# Patient Record
Sex: FEMALE | Race: WHITE
[De-identification: names, ages, dates, MRNs, and addresses within clinical notes are randomized per-mention and may not be internally consistent; named-entity substitution may affect disease eponyms.]

---

## 2018-12-27 NOTE — OP
Operative Note





- Note:


Operative Date: 18


Pre-Operative Diagnosis: prior  delivery, abdominal pain, contractions


Operation: repeat low transverse  delivery


Findings: 





normal b/l tubes/ovaries


live male 


Post-Operative Diagnosis: Same as Pre-op


Surgeon: Lisa Saenz


Assistant: Anton Lanier


Anesthesiologist/CRNA: Elias Gomes


Anesthesia: Spinal


Specimens Removed: placenta


Estimated Blood Loss (mls): 600


Operative Report Dictated: Yes

## 2018-12-27 NOTE — OP
DATE OF OPERATION:  2018

 

PREOPERATIVE DIAGNOSIS:  Prior  delivery.  Abdominal pain.  Uterine

contractions.  Declined  trial of labor after .    

 

POSTOPERATIVE DIAGNOSIS:  Prior  delivery.  Abdominal pain.  Uterine

contractions.  Declined trial of labor after .  

 

SURGERY:  Repeat low transverse  section.  

 

SURGEON:  Lisa Saenz D.O. 

 

ASSISTANT:  MERRILL Costa  

 

ANESTHESIA:  Spinal.

 

ANESTHESIOLOGIST:  Elias Gomes M.D. 

 

ESTIMATED BLOOD LOSS:  600 mL . 

 

SPECIMENS REMOVED:  Placenta.  

 

ESTIMATED BLOOD LOSS:  25 mL.  

 

COMPLICATIONS:  Included elevated blood pressure during procedure requiring 3 
doses

of antihypertensive medications IV.  

COUNTS:  Sponge, needle, instrument counts correct.

 

DISPOSITION:  Stable to PACU.

 

BRIEF HISTORY AND PROCEDURE:  Patient is a 33-year-old P3 female who had been

complaining of abdominal pain and was brought to the hospital and found to have 
contractions on the uterine

tocometer.  She had a prior  delivery and  patient declined a trial of 
labor

after .  At this time the patient was admitted to the hospital and 
signed

consents and was planned for repeat  delivery.  The patient was taken 
back to

the operating room after consents were signed.  She was given spinal anesthesia 
by

Dr. Gomes, placed in the dorsal supine position.  A Mars catheter was placed 
under

sterile conditions.  She was prepped and draped in the usual sterile fashion 
and a

hard timeout was performed.  A Pfannenstiel skin incision was created in the 
skin

with a scalpel and carried to the underlying layer of rectus fascia with the 
Bovie. 

The fascia was incised on either side of the midline with a Bovie, and the 
fascial

incision was carried in the superior lateral direction sharply.  The fascia was

tented upward and dissected off the underlying layer of rectus muscle sharpy.  
The muscles were

identified in the midline and  laterally.  The peritoneum was 
identified and

entered sharply, and the bladder blade was then inserted.  A transverse 
incision in

the lower uterine segment was made and extended in the superior lateral 
direction

bluntly.  The infant was delivered from the left occipital transverse position

without difficulty.  Bilateral shoulders delivered with ease along with the 
remainder

of the infant.  The cord was clamped twice and cut in between.  The infant was 
taken

over to the warmer to be assessed by the neonatology staff, who were present 
for the

entire delivery.  The placenta was delivered intact with a 3-vessel cord.  The 
uterus

was exteriorized and cleared of all amniotic membrane and debris with a dry lap

sponge.  The hysterotomy was reapproximated in a double layer closure, using 1 
Vicryl

suture in a running, locked fashion.  Second layer was 0 Biosyn suture in a 
running,

locked fashion.  Excellent hemostasis was achieved.  The posterior cul-de-sac 
was

suctioned.  Bilateral tubes and ovaries were noted to be normal.  The uterus was

placed back into the abdomen.  Bilateral gutters were inspected and cleared of 
all

debris.  At this point, the patient did start to begin complaining of abdominal 
pain

and nausea and vomiting.  She then started to complain of severe headache.  
Blood

pressure did elevate.  Anesthesia administered 3 doses of antihypertensives

 to bring the pressure down.  Please see anesthesia record for those doses of 
medication.  The patient was

stabilized and the surgery was resumed.  The peritoneum was then reapproximated 
using

2-0 chromic in a running fashion.  The musculature was reapproximated using 2-0

chromic suture interrupted sutures.  The fascia was reapproximated using 1 
Vicryl

suture in a running fashion.  The subcutaneous tissue was irrigated and 
hemostasis

was achieved with Bovie cautery, and the skin was reapproximated using staples.
  The

patient tolerated the procedure well, and was recovering in the PACU at

the time of this dictation.  Sponge, needle and instrument counts were reported 
correct after the case.



Patient's blood pressure after delivery was stabilized. 

Patient's headache has resolved, and her abdominal pain was improving with pain

medication.  Patient will be sent to ICU for monitoring overnight secondary to 
sudden

blood pressure increase with symptoms of headache.  Will plan to start 
magnesium and get CT imaging of head if

any elevated blood pressures overnight.  Also will get neurology consultation. 

 

 

LISA SAENZ DO

 

/[id number]

DD: 2018 22:13

DT: 2018 23:04

Job #:  [id number]

MTDD

## 2018-12-27 NOTE — HP
Past Medical History





- Admission


History Source: Patient


Limitations to Obtaining History: No Limitations





- Past Medical History


Cardiovascular: No: HTN


Pulmonary: No: Asthma


Gastrointestinal: No: GERD


Hepatobiliary: No: Hepatitis B, Hepatitis C


Reproductive: No: PID


...: 5


...Para: 3


...Term: 3


...: 0


...Spon : 0


...Induced : 1


...EDC by Johana: 19


Infectious Disease: No: HIV, MRSA, STD's


Psych: No: Anxiety, Bipolar, Depression


Endocrine: Yes: Other (GDM-diet)


Additional Medical History: Maternal obesity





- Past Surgical History


Past Surgical History: Yes: 


Hx Myomectomy: No


Hx Transabdominal Cerclage: No





- Smoking History


Smoking history: Current every day smoker


Have you smoked in the past 12 months: Yes


Aproximately how many cigarettes per day: 15


If you are a former smoker, when did you quit?: 





- Alcohol/Substance Use


Hx Alcohol Use: Yes (SOCIAL, not in pregnancy)


History of Substance Use: reports: None





- Social History


ADL: Independent


History of Recent Travel: No





Home Medications





- Allergies


Allergies/Adverse Reactions: 


 Allergies











Allergy/AdvReac Type Severity Reaction Status Date / Time


 


Penicillins Allergy   Verified 18 18:25














- Home Medications


Home Medications: 


Ambulatory Orders





Prenatal Vitamins (Sjr) - 1 tab PO DAILY 16 


Ferrous Sulfate [Feosol] 325 mg PO BID 18 











Review of Systems





- Review of Systems


Constitutional: reports: No Symptoms


Eyes: reports: No Symptoms


HENT: reports: No Symptoms


Neck: reports: No Symptoms


Cardiovascular: reports: No Symptoms


Respiratory: reports: No Symptoms


Gastrointestinal: reports: Abdominal Pain (abdominal pain and contractions)


Genitourinary: reports: No Symptoms


Breasts: reports: No Symptoms Reported


Musculoskeletal: reports: No Symptoms


Integumentary: reports: No Symptoms


Neurological: reports: No Symptoms


Endocrine: reports: No Symptoms


Hematology/Lymphatic: reports: No Symptoms


Psychiatric: reports: No Symptoms


Pain Intensity: 5





Physical Exam - Maternity


Vital Signs: 


 Vital Signs











Temperature  98.0 F   18 18:07


 


Pulse Rate  84   18 18:07


 


Respiratory Rate  18   18 18:07


 


Blood Pressure  114/66   18 18:07


 


O2 Sat by Pulse Oximetry (%)      











Constitutional: Yes: Well Nourished, No Distress, Calm


Eyes: Yes: Conjunctiva Clear


HENT: Yes: Atraumatic


Neck: Yes: Supple


Cardiovascular: Yes: Regular Rate and Rhythm


Lungs: Clear to auscultation





- Abdominal Exam/OB


Number of Fetuses: Single


Fetal Presentation: Vertex


Contractions: Yes


Regularity: Irregular


Intensity: Mild/Mod


Fetal Monitor Mode: External


Fetal Heart Rate (range): 120


Category: I


Accelerations: Uniform


Decelerations: None





- Vaginal Exam/OB


Amniotic Membrane Status: Intact





- Physical Exam


Edema: No


Psychiatric: Yes: Alert, Oriented





Hemorrhage Risk Assessment





- Risk Factors


Medium Risk Factors: Yes: Prior , uterine surgery,or multiple 

laparotomies


High Risk Factors: Yes: None


Risk Score: 1


Risk Level: Medium Risk





Problem List





- Problems


(1) Uterine scar from previous  delivery


Code(s): O34.219 - MATERNAL CARE FOR UNSP TYPE SCAR FROM PREVIOUS  DEL 

  





(2) Abdominal pain complicating pregnancy


Code(s): O26.899 - OTH PREGNANCY RELATED CONDITIONS, UNSPECIFIED TRIMESTER; 

R10.9 - UNSPECIFIED ABDOMINAL PAIN   





Assessment/Plan





34 y/o  with SIUP at 38.6 weeks, planned  section on 18 who 

presents to labor and delivery with abdominal pain and contractions.  


Plan for repeat c section. 


R/B/A discussed, consents signed, questions answered


anesthesia and nursery aware

## 2018-12-28 NOTE — PN
Teaching Attending Note


Name of Resident: Flash Lakhani





ATTENDING PHYSICIAN STATEMENT





I saw and evaluated the patient.


I reviewed the resident's note and discussed the case with the resident.


I agree with the resident's findings and plan as documented.








SUBJECTIVE:


Patient seen and examined at bedside.  No headache.  


Minimal abdominal discomfort.


Dopamine @ 4 mcq for hemodynamic support. 





 Intake & Output











 12/25/18 12/26/18 12/27/18 12/28/18





 23:59 23:59 23:59 23:59


 


Intake Total    2598


 


Output Total    2000


 


Balance    598


 


Weight   176 lb 178 lb 8 oz








 Last Vital Signs











Temp Pulse Resp BP Pulse Ox


 


 98.1 F   74   19   99/64   100 


 


 12/28/18 10:00  12/28/18 10:00  12/28/18 10:00  12/28/18 10:00  12/28/18 00:13








Active Medications





Acetaminophen (Ofirmev Injection -)  1,000 mg IVPB Q6H PRN


   PRN Reason: PAIN LEVEL 6-10


   Last Admin: 12/28/18 03:07 Dose:  1,000 mg


Bisacodyl (Dulcolax Suppository -)  10 mg RC DAILY PRN


   PRN Reason: CONSTIPATION


Diphenhydramine HCl (Benadryl Injection -)  25 mg IVPUSH Q4H PRN


   PRN Reason: Pruritis


Enoxaparin Sodium (Lovenox -)  40 mg SQ DAILY TRAVIS


   Last Admin: 12/28/18 09:43 Dose:  40 mg


Dopamine HCl/Dextrose (Dopamine 400 Mg/D5w -)  400,000 mcg in 250 mls @ 14.969 

mls/hr IVPB TITR TRAVIS; Protocol


   Last Titration: 12/28/18 07:00 Dose:  4 mcg/kg/min, 11.975 mls/hr


Lactated Ringer's (Lactated Ringers Solution)  1,000 ml in 1,000 mls @ 100 mls/

hr IV ASDIR TRAVIS


   Last Admin: 12/28/18 08:00 Dose:  100 mls/hr


Lactated Ringer's (Lactated Ringers Solution)  1,000 ml IV ONCE ONE


   Stop: 12/28/18 11:21


Lactated Ringer's (Lactated Ringers Solution)  1,000 ml IV ONCE ONE


   Stop: 12/28/18 12:21


Ondansetron HCl (Zofran Injection)  4 mg IVPUSH Q4H PRN


   PRN Reason: NAUSEA


   Last Admin: 12/28/18 08:29 Dose:  4 mg


Oxycodone HCl (Roxicodone -)  5 mg PO Q4H PRN


   PRN Reason: PAIN LEVEL 4 - 6


   Last Admin: 12/28/18 05:55 Dose:  5 mg


Oxycodone HCl (Roxicodone -)  10 mg PO Q4H PRN


   PRN Reason: PAIN LEVEL 7 - 10


Prenatal Multivit/Folic Acid/Iron (Prenatal Vitamins (Sjr) -)  1 tab PO DAILY 

TRAVIS


Senna/Docusate Sodium (Pericolace -)  2 tablet PO HS PRN


   PRN Reason: CONSTIPATION


Simethicone (Mylicon -)  80 mg PO Q4H PRN


   PRN Reason: GAS








  








OBJECTIVE:





  


 Intake & Output











 12/25/18 12/26/18 12/27/18 12/28/18





 23:59 23:59 23:59 23:59


 


Intake Total    2598


 


Output Total    2000


 


Balance    598


 


Weight   176 lb 178 lb 8 oz





 Last Vital Signs











Temp Pulse Resp BP Pulse Ox


 


 98.1 F   74   19   99/64   100 


 


 12/28/18 10:00  12/28/18 10:00  12/28/18 10:00  12/28/18 10:00  12/28/18 00:13








Active Medications





Acetaminophen (Ofirmev Injection -)  1,000 mg IVPB Q6H PRN


   PRN Reason: PAIN LEVEL 6-10


   Last Admin: 12/28/18 03:07 Dose:  1,000 mg


Bisacodyl (Dulcolax Suppository -)  10 mg RC DAILY PRN


   PRN Reason: CONSTIPATION


Diphenhydramine HCl (Benadryl Injection -)  25 mg IVPUSH Q4H PRN


   PRN Reason: Pruritis


Enoxaparin Sodium (Lovenox -)  40 mg SQ DAILY TRAVIS


   Last Admin: 12/28/18 09:43 Dose:  40 mg


Dopamine HCl/Dextrose (Dopamine 400 Mg/D5w -)  400,000 mcg in 250 mls @ 14.969 

mls/hr IVPB TITR TRAVIS; Protocol


   Last Titration: 12/28/18 07:00 Dose:  4 mcg/kg/min, 11.975 mls/hr


Lactated Ringer's (Lactated Ringers Solution)  1,000 ml in 1,000 mls @ 100 mls/

hr IV ASDIR TRAVIS


   Last Admin: 12/28/18 08:00 Dose:  100 mls/hr


Lactated Ringer's (Lactated Ringers Solution)  1,000 ml IV ONCE ONE


   Stop: 12/28/18 11:21


Lactated Ringer's (Lactated Ringers Solution)  1,000 ml IV ONCE ONE


   Stop: 12/28/18 12:21


Ondansetron HCl (Zofran Injection)  4 mg IVPUSH Q4H PRN


   PRN Reason: NAUSEA


   Last Admin: 12/28/18 08:29 Dose:  4 mg


Oxycodone HCl (Roxicodone -)  5 mg PO Q4H PRN


   PRN Reason: PAIN LEVEL 4 - 6


   Last Admin: 12/28/18 05:55 Dose:  5 mg


Oxycodone HCl (Roxicodone -)  10 mg PO Q4H PRN


   PRN Reason: PAIN LEVEL 7 - 10


Prenatal Multivit/Folic Acid/Iron (Prenatal Vitamins (Sjr) -)  1 tab PO DAILY 

TRAVIS


Senna/Docusate Sodium (Pericolace -)  2 tablet PO HS PRN


   PRN Reason: CONSTIPATION


Simethicone (Mylicon -)  80 mg PO Q4H PRN


   PRN Reason: GAS














GENERAL: A&Ox3, NAD


HEAD: NC/AT


EYES: PERRLA, EOMI


ENT: MMM


NECK: Trachea midline, full range of motion, supple. 


LUNGS: CTA b/l


HEART: RRR no m/r/g


ABDOMEN: (+) BS, soft, tenderness c/w post-surgical status, dressing c/d/i


EXTREMITIES: 2+ pulses, warm, well-perfused, no edema. 


NEUROLOGICAL: CNs, motor, sensory systems w/o focal deficit


PSYCH: Normal mood, normal affect.


SKIN: Warm, dry, normal turgor, no rashes or lesions noted














 Laboratory Results - last 24 hr











  12/27/18 12/28/18 12/28/18





  22:45 01:00 01:00


 


WBC   12.7 H 


 


RBC   3.65 


 


Hgb   11.2 


 


Hct   32.6 


 


MCV   89.4 


 


MCH   30.8 


 


MCHC   34.5 


 


RDW   14.0 


 


Plt Count   123 L 


 


MPV   10.7 


 


Absolute Neuts (auto)   


 


Neutrophils %   


 


Lymphocytes %   


 


Monocytes %   


 


Eosinophils %   


 


Basophils %   


 


Nucleated RBC %   


 


Sodium    141


 


Potassium    3.6


 


Chloride    114 H


 


Carbon Dioxide    20 L


 


Anion Gap    7 L


 


BUN    7


 


Creatinine    0.4 L


 


Creat Clearance w eGFR    > 60


 


Random Glucose    78


 


Calcium    6.4 L*


 


Magnesium    1.5 L


 


Total Bilirubin   


 


AST   


 


ALT   


 


Alkaline Phosphatase   


 


Total Protein   


 


Albumin   


 


Urine Color  Yellow  


 


Urine Appearance  Clear  


 


Urine pH  6.0  


 


Ur Specific Gravity  1.033  


 


Urine Protein  1+ H  


 


Urine Glucose (UA)  Negative  


 


Urine Ketones  2+ H  


 


Urine Blood  1+ H  


 


Urine Nitrite  Negative  


 


Urine Bilirubin  Negative  


 


Urine Urobilinogen  Negative  


 


Ur Leukocyte Esterase  Negative  


 


Urine WBC (Auto)  None  


 


Urine RBC (Auto)  27  


 


Ur Epithelial Cells  Rare  


 


Urine Mucus  Few  














  12/28/18 12/28/18 12/28/18





  01:00 05:15 05:15


 


WBC   11.0 H 


 


RBC   4.04 


 


Hgb   11.8 


 


Hct   37.0 


 


MCV   91.6 


 


MCH   29.2 


 


MCHC   31.9 L 


 


RDW   14.4 


 


Plt Count   118 L 


 


MPV   10.4 


 


Absolute Neuts (auto)   8.7 H 


 


Neutrophils %   78.6 


 


Lymphocytes %   15.3 


 


Monocytes %   4.7 


 


Eosinophils %   0.9 


 


Basophils %   0.5 


 


Nucleated RBC %   0 


 


Sodium  142   140


 


Potassium  3.6   4.2


 


Chloride  114 H   110 H


 


Carbon Dioxide  20 L   22


 


Anion Gap  9   7 L


 


BUN  7   6 L


 


Creatinine  0.4 L   0.5 L


 


Creat Clearance w eGFR  > 60   > 60


 


Random Glucose  80   113 H


 


Calcium  6.4 L*   6.8 L*


 


Magnesium    1.9


 


Total Bilirubin  0.1 L  


 


AST  45 H  


 


ALT  56  


 


Alkaline Phosphatase  74  


 


Total Protein  4.4 L  


 


Albumin  1.8 L  


 


Urine Color   


 


Urine Appearance   


 


Urine pH   


 


Ur Specific Gravity   


 


Urine Protein   


 


Urine Glucose (UA)   


 


Urine Ketones   


 


Urine Blood   


 


Urine Nitrite   


 


Urine Bilirubin   


 


Urine Urobilinogen   


 


Ur Leukocyte Esterase   


 


Urine WBC (Auto)   


 


Urine RBC (Auto)   


 


Ur Epithelial Cells   


 


Urine Mucus   











ASSESSMENT/PLAN:


Resolving Hypotension: (?) Due to medications 


Transient Gina-operative hypertensive urgency 


S/P C section 12/27





Wean Dopamine 


Increase IVF resuscitation 


PO as tolerated


VTE prophylaxis 


Monitor I & O 


Incentive Spirometry 


ICU monitoring while on pressors





Dr Yost 


Critical care time spent in reviewing chart, evaluating patient and formulating 

plan - 36 minutes.

## 2018-12-28 NOTE — PN
Post Partum Progress Note





- Subjective


Subjective: 





pt seen and evaluated.   feeling well.  thirsty.  denies HA/CP/RUQ pain or 

changes in vision.  /50 upon my entry to room on dopamine.  No flatus 

yet.  Abdominal pain controlled with medications.  Not yet OOB.  Casillas catheter 

draining clear yellow urine. 


Type of Delivery: Repeat C/S


Vital Signs: 


 Vital Signs











Temperature  98.9 F   18 06:00


 


Pulse Rate  59 L  18 06:00


 


Respiratory Rate  23 H  18 06:00


 


Blood Pressure  119/61   18 06:00


 


O2 Sat by Pulse Oximetry (%)  100   18 00:13











Uterus: Yes: Fundus Firm


Incision: Yes: Dressing dry and intact


Abdomen/GI: Yes: Abdomen soft, Tender (appropriate post surgical tenderness)


Lochia: Yes: Rubra


Lochia, amount: Moderate


Perineum: No: Intact





- Labs


Labs: 


 CBC











WBC  11.0 K/mm3 (4.0-10.0)  H  18  05:15    


 


RBC  4.04 M/mm3 (3.60-5.2)   18  05:15    


 


Hgb  11.8 GM/dL (10.7-15.3)   18  05:15    


 


Hct  37.0 % (32.4-45.2)   18  05:15    


 


MCV  91.6 fl (80-96)   18  05:15    


 


MCH  29.2 pg (25.7-33.7)   18  05:15    


 


MCHC  31.9 g/dl (32.0-36.0)  L  18  05:15    


 


RDW  14.4 % (11.6-15.6)   18  05:15    


 


Plt Count  118 K/MM3 (134-434)  L  18  05:15    


 


MPV  10.4 fl (7.5-11.1)   18  05:15    


 


Absolute Neuts (auto)  8.7 K/mm3 (1.5-8.0)  H  18  05:15    


 


Neutrophils %  78.6 % (42.8-82.8)   18  05:15    


 


Lymphocytes %  15.3 % (8-40)   18  05:15    


 


Monocytes %  4.7 % (3.8-10.2)   18  05:15    


 


Eosinophils %  0.9 % (0-4.5)   18  05:15    


 


Basophils %  0.5 % (0-2.0)   18  05:15    


 


Nucleated RBC %  0 % (0-0)   18  05:15    














Problem List





- Problems


(1) Uterine scar from previous  delivery


Code(s): O34.219 - MATERNAL CARE FOR UNSP TYPE SCAR FROM PREVIOUS  DEL 

  





(2) Abdominal pain complicating pregnancy


Code(s): O26.899 - OTH PREGNANCY RELATED CONDITIONS, UNSPECIFIED TRIMESTER; 

R10.9 - UNSPECIFIED ABDOMINAL PAIN   





(3) Hypotension after procedure


Code(s): I95.81 - POSTPROCEDURAL HYPOTENSION   





Assessment/Plan





clear diet


blood pressure control per medical/icu team


if able to stabilize BP can dc from ICU to floor


lovenox


scds


discontinue casillas catheter this afternoon/evening


strict I/Os for now

## 2018-12-28 NOTE — PN
Progress Note (short form)





- Note


Progress Note: 





Anesthesia/pain


Pt seen and examined


S:Alert and awake comfortable


O:


 CBC, BMP





 18 05:15 





 18 05:15 





 Vital Signs











Temperature  98.9 F   18 06:00


 


Pulse Rate  59 L  18 06:00


 


Respiratory Rate  23 H  18 06:00


 


Blood Pressure  119/61   18 06:00


 


O2 Sat by Pulse Oximetry (%)  100   18 00:13











A/P:


Current Active Problems





Abdominal pain complicating pregnancy (Acute)


Hypotension after procedure (Acute)


Uterine scar from previous  delivery (Acute)








s/p c section


BP stablized


Doing well post op


Continue current care


Malick Sanchez MD

## 2018-12-28 NOTE — CONSULT
Consult


Consult Specialty:: Pulm/CCM


Referred by:: Dr. Saenz


Reason for Consultation:: medication induced hypotension





- History of Present Illness


History of Present Illness: 





32 yo  w/ no significant medical history now s/p  admitted to the 

ICU for medication induced hypotension. Patient was transiently hypertensive 

with SBP ~ 240 and c/o headache intraoperatively. She subsequently received 

10mg IV metoprolol, 10mg IV verapramil, 10mg IV enalarpril. Unfortunately her 

BP tanked to the low 100s. Per signout from L&D nurse, patient received 2L of NS

+oxytocin and 1L of NS. Patient received another 1L of NS in ICU but BP still 

remains low with MAP in low 50s. As a result, she's admitted to the ICU for 

further fluid resuscitation, BP monitoring, stroke precaution and possible 

pressor support. Denies h/o preclampsia, chest pain, vision change, focal 

weakness, shortness of breath, difficulty in speech.





- Past Medical History


Cardio/Vascular: No: HTN


Pulmonary: No: Asthma


Gastrointestinal: No: GERD


Hepatobiliary: No: Hepatitis B, Hepatitis C


...LMP: 13


Infectious Disease: No: HIV, MRSA, STD's


Psych: No: Anxiety, Bipolar, Depression


Endocrine: Yes: Other (GDM-diet)


Additional Medical History: Maternal obesity





- Past Surgical History


Past Surgical History: Yes: 





- Alcohol/Substance Use


Hx Alcohol Use: Yes (SOCIAL, not in pregnancy)


History of Substance Use: reports: None





- Smoking History


Smoking history: Current every day smoker


Have you smoked in the past 12 months: Yes


Aproximately how many cigarettes per day: 15


If you are a former smoker, when did you quit?: 





- Social History


ADL: Independent


History of Recent Travel: No





Home Medications





- Allergies


Allergies/Adverse Reactions: 


 Allergies











Allergy/AdvReac Type Severity Reaction Status Date / Time


 


Penicillins Allergy   Verified 18 18:25














- Home Medications


Home Medications: 


Ambulatory Orders





Prenatal Vitamins (Sjr) - 1 tab PO DAILY 16 


Ferrous Sulfate [Feosol] 325 mg PO BID 18 











Review of Systems





- Review of Systems


Constitutional: denies: Chills, Fever


Eyes: denies: Blind Spots, Blurred Vision, Double Vision, Recent Change in 

Vision


Cardiovascular: denies: Chest Pain, Palpitations, Shortness of Breath


Respiratory: denies: Cough


Gastrointestinal: denies: Abdominal Pain


Neurological: denies: Change in LOC, Change in Speech, Confusion, Headache, 

Numbness, Parasthesia, Weakness


Pain Intensity: 0





Physical Exam


Vital Signs: 


 Vital Signs











Temperature  97.7 F   18 21:45


 


Pulse Rate  63   18 22:30


 


Respiratory Rate  18   18 22:30


 


Blood Pressure  75/37 L  18 22:30


 


O2 Sat by Pulse Oximetry (%)  100   18 22:30











Constitutional: Yes: Well Nourished, No Distress, Calm


Eyes: Yes: Conjunctiva Clear, EOM Intact, PERRL


HENT: Yes: Atraumatic, Normocephalic


Neck: Yes: Supple, Trachea Midline


Cardiovascular: Yes: Bradycardia, S1, S2.  No: Murmur


Respiratory: Yes: Regular, CTA Bilaterally


Gastrointestinal: Yes: Other (dressing in place)


Edema: No


Wound/Incision: Yes: Clean/Dry.  No: Bleeding


Neurological: Yes: Alert, Oriented, Cran Nerves II-XII Intact.  No: Aphasia





Assessment/Plan





32 yo  w/ no significant medical history now s/p  admitted to the 

ICU for medication induced hypotension.





A/P





1. medication induced hypotension: refractory, s/p 4L of isotonic fluids, will 

start dopamine gtt in light of bradycardia and peripherally in anticipation of 

short duration of pressor support (< 4hours)





2. s/p  w/ 600cc blood loss: repeat CBC and BMP














Bharath Alston MD


ICU resident








Visit type





- Emergency Visit


Emergency Visit: No





- New Patient


This patient is new to me today: Yes


Date on this admission: 18





- Critical Care


Critical Care patient: Yes


Total Critical Care Time (in minutes): 35


Critical Care Statement: The care of this patient involved high complexity 

decision making to prevent further life threatening deterioration of the patient

's condition and/or to evaluate & treat vital organ system(s) failure or risk 

of failure.

## 2018-12-28 NOTE — PN
Physical Exam: 


SUBJECTIVE: Patient seen and examined at bedside. Headaches resolved. Abdominal 

pain c/w s/p . No flatus as yet. C/o nausea. No CP, no SOB, no 

lightheadedness. Eager for downgrade to maternity unit. 








OBJECTIVE:





 Vital Signs











 Period  Temp  Pulse  Resp  BP Sys/Sandoval  Pulse Ox


 


 Last 24 Hr  97.4 F-98.9 F  54-90  18-23  /36-74  100-100











GENERAL: A&Ox3, NAD


HEAD: NC/AT


EYES: PERRLA, EOMI


ENT: MMM


NECK: Trachea midline, full range of motion, supple. 


LUNGS: CTA b/l


HEART: RRR no m/r/g


ABDOMEN: distant bs, soft, tenderness c/w post-surgical status, dressing c/d/i


EXTREMITIES: 2+ pulses, warm, well-perfused, no edema. 


NEUROLOGICAL: CNs, motor, sensory systems w/o focal deficit


PSYCH: Normal mood, normal affect.


SKIN: Warm, dry, normal turgor, no rashes or lesions noted














 Laboratory Results - last 24 hr











  18





  22:45 01:00 01:00


 


WBC   12.7 H 


 


RBC   3.65 


 


Hgb   11.2 


 


Hct   32.6 


 


MCV   89.4 


 


MCH   30.8 


 


MCHC   34.5 


 


RDW   14.0 


 


Plt Count   123 L 


 


MPV   10.7 


 


Absolute Neuts (auto)   


 


Neutrophils %   


 


Lymphocytes %   


 


Monocytes %   


 


Eosinophils %   


 


Basophils %   


 


Nucleated RBC %   


 


Sodium    141


 


Potassium    3.6


 


Chloride    114 H


 


Carbon Dioxide    20 L


 


Anion Gap    7 L


 


BUN    7


 


Creatinine    0.4 L


 


Creat Clearance w eGFR    > 60


 


Random Glucose    78


 


Calcium    6.4 L*


 


Magnesium    1.5 L


 


Total Bilirubin   


 


AST   


 


ALT   


 


Alkaline Phosphatase   


 


Total Protein   


 


Albumin   


 


Urine Color  Yellow  


 


Urine Appearance  Clear  


 


Urine pH  6.0  


 


Ur Specific Gravity  1.033  


 


Urine Protein  1+ H  


 


Urine Glucose (UA)  Negative  


 


Urine Ketones  2+ H  


 


Urine Blood  1+ H  


 


Urine Nitrite  Negative  


 


Urine Bilirubin  Negative  


 


Urine Urobilinogen  Negative  


 


Ur Leukocyte Esterase  Negative  


 


Urine WBC (Auto)  None  


 


Urine RBC (Auto)  27  


 


Ur Epithelial Cells  Rare  


 


Urine Mucus  Few  














  18





  01:00 05:15 05:15


 


WBC   11.0 H 


 


RBC   4.04 


 


Hgb   11.8 


 


Hct   37.0 


 


MCV   91.6 


 


MCH   29.2 


 


MCHC   31.9 L 


 


RDW   14.4 


 


Plt Count   118 L 


 


MPV   10.4 


 


Absolute Neuts (auto)   8.7 H 


 


Neutrophils %   78.6 


 


Lymphocytes %   15.3 


 


Monocytes %   4.7 


 


Eosinophils %   0.9 


 


Basophils %   0.5 


 


Nucleated RBC %   0 


 


Sodium  142   140


 


Potassium  3.6   4.2


 


Chloride  114 H   110 H


 


Carbon Dioxide  20 L   22


 


Anion Gap  9   7 L


 


BUN  7   6 L


 


Creatinine  0.4 L   0.5 L


 


Creat Clearance w eGFR  > 60   > 60


 


Random Glucose  80   113 H


 


Calcium  6.4 L*   6.8 L*


 


Magnesium    1.9


 


Total Bilirubin  0.1 L  


 


AST  45 H  


 


ALT  56  


 


Alkaline Phosphatase  74  


 


Total Protein  4.4 L  


 


Albumin  1.8 L  


 


Urine Color   


 


Urine Appearance   


 


Urine pH   


 


Ur Specific Gravity   


 


Urine Protein   


 


Urine Glucose (UA)   


 


Urine Ketones   


 


Urine Blood   


 


Urine Nitrite   


 


Urine Bilirubin   


 


Urine Urobilinogen   


 


Ur Leukocyte Esterase   


 


Urine WBC (Auto)   


 


Urine RBC (Auto)   


 


Ur Epithelial Cells   


 


Urine Mucus   








Active Medications











Generic Name Dose Route Start Last Admin





  Trade Name Freq  PRN Reason Stop Dose Admin


 


Acetaminophen  1,000 mg  18 21:48  18 03:07





  Ofirmev Injection -  IVPB   1,000 mg





  Q6H PRN   Administration





  PAIN LEVEL 6-10   





     





     





     


 


Bisacodyl  10 mg  18 21:53  





  Dulcolax Suppository -  RC   





  DAILY PRN   





  CONSTIPATION   





     





     





     


 


Diphenhydramine HCl  25 mg  18 20:32  





  Benadryl Injection -  IVPUSH   





  Q4H PRN   





  Pruritis   





     





     





     


 


Enoxaparin Sodium  40 mg  18 10:00  





  Lovenox -  SQ   





  DAILY TRAVIS   





     





     





     





     


 


Parenteral Electrolytes  1,000 mls @ 125 mls/hr  18 20:45  18 00:26





  Plasma-Lyte 148 -  IV   Not Given





  ASDIR TRAVIS   





     





     





     





     


 


Dopamine HCl/Dextrose  400,000 mcg in 250 mls @ 14.969 mls/hr  18 01:15  

18 03:00





  Dopamine 400 Mg/D5w -  IVPB   5 mcg/kg/min





  TITR TRAVIS   14.969 mls/hr





     Titration





     





  Protocol   





  5 MCG/KG/MIN   


 


Sodium Chloride  1,000 mls @ 100 mls/hr  18 07:00  





  Normal Saline -  IV   





  ASDIR TRAVIS   





     





     





     





     


 


Ondansetron HCl  4 mg  18 20:32  





  Zofran Injection  IVPUSH   





  Q4H PRN   





  NAUSEA   





     





     





     


 


Oxycodone HCl  5 mg  18 21:53  18 05:55





  Roxicodone -  PO   5 mg





  Q4H PRN   Administration





  PAIN LEVEL 4 - 6   





     





     





     


 


Oxycodone HCl  10 mg  18 21:53  





  Roxicodone -  PO   





  Q4H PRN   





  PAIN LEVEL 7 - 10   





     





     





     


 


Prenatal Multivit/Folic Acid/Iron  1 tab  18 10:00  





  Prenatal Vitamins (Sjr) -  PO   





  DAILY TRAVIS   





     





     





     





     


 


Senna/Docusate Sodium  2 tablet  18 21:53  





  Pericolace -  PO   





  HS PRN   





  CONSTIPATION   





     





     





     


 


Simethicone  80 mg  18 21:53  





  Mylicon -  PO   





  Q4H PRN   





  GAS   





     





     





     











ASSESSMENT/PLAN:


34 y/o F  w/ no significant PMHx s/p , admitted to ICU for 

medication-induced hypotension following transient elsy-operative hypertensive 

urgency.





#hypotension


-600cc blood loss operatively


-H/H stable


-received 5L crystalloid resuscitation


-was on standing NS @ 100, now switched to LR @ 100


-on peripherally administered dopamine maintaining MAP > 65


-providing further fluid boluses, downtitrating and discontinuing dopamine





#FEN


-LR @ 100


-monitor and correct electrolytes; exercise caution in administering magnesium 

given hypotension


-clear liquid, adv to regular with flatus





#PPx


-DVT: Lovenox


-GI: not indicated





#code


-full





#dispo


-transfer to maternity when weaned from pressor support











Visit type





- Emergency Visit


Emergency Visit: No





- New Patient


This patient is new to me today: Yes


Date on this admission: 18





- Critical Care


Critical Care patient: Yes


Total Critical Care Time (in minutes): 40


Critical Care Statement: The care of this patient involved high complexity 

decision making to prevent further life threatening deterioration of the patient

's condition and/or to evaluate & treat vital organ system(s) failure or risk 

of failure.

## 2018-12-28 NOTE — CON.NEURO
Consult





- Past Medical History


Cardio/Vascular: No: HTN


Pulmonary: No: Asthma


Gastrointestinal: No: GERD


Hepatobiliary: No: Hepatitis B, Hepatitis C


...LMP: 13


Infectious Disease: No: HIV, MRSA, STD's


Psych: No: Anxiety, Bipolar, Depression


Endocrine: Yes: Other (GDM-diet)


Additional Medical History: Maternal obesity





- Past Surgical History


Past Surgical History: Yes: 





- Alcohol/Substance Use


Hx Alcohol Use: Yes (SOCIAL, not in pregnancy)


History of Substance Use: reports: None





- Smoking History


Smoking history: Current every day smoker


Have you smoked in the past 12 months: Yes


Aproximately how many cigarettes per day: 15


If you are a former smoker, when did you quit?: 





- Social History


ADL: Independent


History of Recent Travel: No





Home Medications





- Allergies


Allergies/Adverse Reactions: 


 Allergies











Allergy/AdvReac Type Severity Reaction Status Date / Time


 


Penicillins Allergy   Verified 18 18:25














- Home Medications


Home Medications: 


Ambulatory Orders





Prenatal Vitamins (Sjr) - 1 tab PO DAILY 16 


Ferrous Sulfate [Feosol] 325 mg PO BID 18 











Physical Exam-Neuro


Vital Signs: 


 Vital Signs











Temperature  98.1 F   18 10:00


 


Pulse Rate  70   18 12:25


 


Respiratory Rate  19   18 10:00


 


Blood Pressure  111/53 L  18 12:25


 


O2 Sat by Pulse Oximetry (%)  100   18 00:13











Labs: 


 CBC, BMP





 18 05:15 





 18 05:15 











Assessment/Plan


cc Severe Headache for one day


HPI 33 year old female history of labile headache during pregnancy and 

occasional migraine. Patient has very severe headache yesterday at the same time

, when her bp went up.


After treating with medication, her bp dropped and now she is on pressors. 

Patient is feeling fine now and no headhace, no fever or trauma.


There is no history of Sah in her family





Medical History as above


Social History, Family History, and ROS reviewed in chart














Allergies/Adverse Reactions: 


 Allergies











Allergy/AdvReac Type Severity Reaction Status Date / Time


 


Penicillins Allergy   Verified 18 18:25

















Home Medications: 








Prenatal Vitamins (Sjr) - 1 tab PO DAILY 16 


Ferrous Sulfate [Feosol] 325 mg PO BID 18 








NEUROLOGICAL Examiantion


Alert oriented x 3, speech is normal


EOMI, Pupils reactive, VF normal by confrontation


Movign all extremity


sensation is normal


reflex are symmetrical








NO recent brain imaging





Assessment/Plan Severe headache coinciding with very high bp. there is no focal 

neurological symptoms or sign and headhace is completely resolved, and there is 

no neck stiffness ( unlikley to be Meningitis or SAH)





Plan: discussed need for ct head , patient is reluctant, suspician for sah is 

low and would hold for now





- If headhace recurs or any focal neuro symptoms, please do get stat ct head


- Neurocheck for now





Thanking you so much


Clement Morales MD

## 2018-12-29 NOTE — PN
Post Partum Progress Note





- Subjective


Subjective: 





Pt seen/evaluated.  Feeling much improved.  Tolerating clears, has appetite for 

regular diet.  +flatus.  Abdomen soft.  Lochia minimal.  Ambulating and 

voiding.  Feels tired. 


Type of Delivery: Repeat C/S


Vital Signs: 


 Vital Signs











Temperature  98.5 F   18 08:55


 


Pulse Rate  70   18 08:55


 


Respiratory Rate  18   18 08:55


 


Blood Pressure  109/59 L  18 08:55


 


O2 Sat by Pulse Oximetry (%)  100   18 00:13











Breast Exam: Yes: Soft


Uterus: Yes: Fundus Firm


Incision: Yes: Staples intact


Abdomen/GI: Yes: Abdomen soft


Lochia: Yes: Rubra


Lochia, amount: Small


Extremities: No: Edema


Perineum: Yes: Intact


Activity: Ambulating





- Labs


Labs: 


 CBC











WBC  9.9 K/mm3 (4.0-10.0)   18  07:33    


 


RBC  3.45 M/mm3 (3.60-5.2)  L  18  07:33    


 


Hgb  10.7 GM/dL (10.7-15.3)   18  07:33    


 


Hct  30.6 % (32.4-45.2)  L D 18  07:33    


 


MCV  88.8 fl (80-96)   18  07:33    


 


MCH  31.1 pg (25.7-33.7)   18  07:33    


 


MCHC  35.1 g/dl (32.0-36.0)   18  07:33    


 


RDW  14.1 % (11.6-15.6)   18  07:33    


 


Plt Count  120 K/MM3 (134-434)  L  18  07:33    


 


MPV  10.3 fl (7.5-11.1)   18  07:33    


 


Absolute Neuts (auto)  7.9 K/mm3 (1.5-8.0)   18  07:33    


 


Neutrophils %  79.6 % (42.8-82.8)   18  07:33    


 


Lymphocytes %  14.0 % (8-40)   18  07:33    


 


Monocytes %  5.2 % (3.8-10.2)   18  07:33    


 


Eosinophils %  0.7 % (0-4.5)   18  07:33    


 


Basophils %  0.5 % (0-2.0)   18  07:33    


 


Nucleated RBC %  0 % (0-0)   18  07:33    














Problem List





- Problems


(1) Uterine scar from previous  delivery


Code(s): O34.219 - MATERNAL CARE FOR UNSP TYPE SCAR FROM PREVIOUS  DEL 

  





(2) Abdominal pain complicating pregnancy


Code(s): O26.899 - OTH PREGNANCY RELATED CONDITIONS, UNSPECIFIED TRIMESTER; 

R10.9 - UNSPECIFIED ABDOMINAL PAIN   





(3) Hypotension after procedure


Code(s): I95.81 - POSTPROCEDURAL HYPOTENSION   





Assessment/Plan





regular diet


blood pressure control stabilized, transfered out of ICU overnight to floor


lovenox


scds


regular diet


PO pain meds


if has headache will do neuro workup but pt feels well now, currently declines 

CT scan

## 2018-12-31 NOTE — DS
Physical Exam-GYN


Vital Signs: 


 Vital Signs











Temperature  98.1 F   18 20:35


 


Pulse Rate  71   18 20:35


 


Respiratory Rate  20   18 20:35


 


Blood Pressure  119/61   18 20:35


 


O2 Sat by Pulse Oximetry (%)  100   18 00:13











Labs: 


 CBC, BMP





 18 08:25 





 18 07:33 











Delivery





- Delivery


Type of Anesthesia: Spinal


EBL (cc): 600





Delivery, Single Birth





- Stages of Labor


Date 1st Stage Initiatied: 18


Time 1st Stage Initiated: 17:00


Date of Delivery: 18


Time of Delivery: 21:05


Time Placenta Delivered: 21:07





- Condition of Infant


Pediatrician/Neonatologist Present: Yes


Name: Viktoriya Chin


Infant Gender: Female


Birth Weight: 7 lb 6 oz


Position: Left, OT


Total Hours ROM (Hrs/Mins): 2m





- Apgar


  ** 1 Minute


Apgar Total Score: 9





  ** 5 Minutes


Apgar Total Score: 9





- [place] Feeding Plan


Initial Plan: Elected not to breastfeed exclusively throughout hospitalization





Discharge Summary


Current Active Problems





Abdominal pain complicating pregnancy (Acute)


Hypotension after procedure (Acute)


Uterine scar from previous  delivery (Acute)








Procedures: Principal: Repeat low transverse  delivery


Hospital Course: 





Pt admitted on  for contractions/labor, had prior  delivery.  Pt 

underwent repeat  delivery.  The surgery itself was uncomplicated.  

During the surgery the patient had sudden HTN and headache, received 

antihypertensives per anesthesia and s/p procedure was sent to ICU for 

monitoring.  Pt was on Dopamine gtt for approx 24 hours and then was stabilized 

and sent to the floor.  Neurology evaluated the patient as well.  After the 

patient went to the normal post partum floor she recovered without complication/

issue and was discharged home on post op day 4. 





- Instructions


Diet, Activity, Other Instructions: 





Physical activity





Resume your normal everyday activity as tolerated no heavy lifting or exercise 

until seen by your surgeon. You may walk unlimited jarrett of and climb stairs. 

You may resume driving the car when you feel safe and comfortable behind the 

wheel. No sexual activity as instructed.





Wound care


Please return to the office in 1 week for stable removal. 





Diet


There are no dietary restrictions. Eat healthy, high-fiber foods. Drink 6 to 8 

glasses of liquid each day. This will assist in keeping your bowels  regular.





Pain management


You may take Tylenol  or Ibuprofen (for example, Motrin, Advil etc.) for mild 

pain.  If any prescription mediation is sent to the pharmacy it should be taken 

as prescribed for moderate to severe pain.





Call MD for any of the following:





Severe pain not relieved by medication


Fever of 101 or higher


Excessive bleeding or drainage on dressing


Inability to urinate

















Disposition: HOME





- Home Medications


Comprehensive Discharge Medication List: 


Ambulatory Orders





Prenatal Vitamins (Sjr) - 1 tab PO DAILY 16 


Ferrous Sulfate [Feosol] 325 mg PO BID 18

## 2019-01-02 NOTE — PATH
Surgical Pathology Report



Patient Name:  BUCK FUNG

Accession #:  [id number]

Med. Rec. #:  [id number]                                                        

   /Age/Gender:  1985 (Age: 33) / F

Account:  [id number]                                                          

             Location: [facility] OBS/GYN

Taken:  2018

Received:  2018

Reported:  2019

Physicians:  Lisa Saenz M.D.

  



Specimen(s) Received

 PLACENTA 





Clinical History

 for repeat ,  x2,  x1

EDC-19







Final Diagnosis

PLACENTA,  SECTION:

512 G THIRD TRIMESTER PLACENTA WITH TRIVASCULAR UMBILICAL CORD AND UNREMARKABLE

PLACENTAL MEMBRANES.





***Electronically Signed***

Kaity Anderson M.D.





Gross Description

The specimen is received fresh labeled placenta and is a 512 gram, 15.0 x 15.0 x

3.8 cm. placenta with attached membranes and umbilical cord.  The attached

membranes are tan, translucent with focal opacities and insert marginally.  The

umbilical cord measures 33 cm. in length and averages 1 cm. in diameter.  The

cord inserts eccentrically, 5 cm. to the nearest margin.  No true knots or

strictures are identified. Cut surface of the umbilical cord reveals 3 vessels.

The fetal surface is grey-blue with minimal fibrin deposition and appropriate

caliber vessels.  The maternal surface is red-brown with focal defects. 

Sectioning reveals red-brown, spongy parenchyma.  No lesions are identified. 

Representative sections are submitted in three cassettes as follows: 1- membrane

rolls and umbilical cord; 2-3- full thickness sections of placenta.





[facility]

## 2019-09-02 NOTE — PDOC
Documentation entered by Angie Mcmahon SCRIBE, acting as scribe for Hyun Gongora MD.








Hyun Gongora MD:  This documentation has been prepared by the scribe, Angie Mcmahon SCRIBE, under my direction and personally reviewed by me in its 

entirety.  I confirm that the documentation accurately reflects all work, 

treatment, procedures, and medical decision making performed by me.  





History of Present Illness





- General


Chief Complaint: Pain, Acute


Stated Complaint: TOOTH PAIN TODAY


History Source: Patient


Exam Limitations: No Limitations





- History of Present Illness


Initial Comments: 





19 22:03


The patient is a 34-year-old female, with no past medical history, who presents 

to the ED with pain to the LT lateral mandibular incisor that began at 3 PM 

today after eating pretzels. She describes the pain as throbbing in sensation, 

radiating up the LT side of his face. Pain progressively worsened at 8:30 PM 

tonight. She reports alternating between Ibuprofen and Tylenol with no relief 

of her symptoms. She has a scheduled appointment with her dentist tomorrow. 


 


The patient denies any fevers or chills. Denies any other injuries or symptoms. 


 


Allergies: Penicillins. 








Past History





- Past Medical History


Allergies/Adverse Reactions: 


 Allergies











Allergy/AdvReac Type Severity Reaction Status Date / Time


 


Penicillins Allergy   Verified 19 21:21











Home Medications: 


Ambulatory Orders





Clindamycin HCl [Cleocin HCl] 300 mg PO TID #12 capsule 19 








Asthma: No


Cancer: No


Cardiac Disorders: No


Diabetes: No


HTN: No


Seizures: No


Thyroid Disease: No





- Reproductive History


 (#): 2


Para: 2





- Suicide/Smoking/Psychosocial Hx


Smoking Status: Yes


Smoking History: Current every day smoker


Have you smoked in the past 12 months: Yes


Number of Cigarettes Smoked Daily: 15


If you are a former smoker, when did you quit?: 


Hx Alcohol Use: Yes (SOCIAL, not in pregnancy)


Drug/Substance Use Hx: No


Substance Use Type: None


Hx Substance Use Treatment: No





**Review of Systems





- Review of Systems


Able to Perform ROS?: Yes


Comments:: 





19 22:04


GENERAL/CONSTITUTIONAL: No fever or chills. No weakness.


HEAD, EYES, EARS, NOSE AND THROAT: (+)Pain to LT lateral mandibular incisor. No 

change in vision. No ear pain or discharge. No sore throat.


CARDIOVASCULAR: No chest pain or shortness of breath.


RESPIRATORY: No cough, wheezing, or hemoptysis.


GASTROINTESTINAL: No nausea, vomiting, diarrhea or constipation.


GENITOURINARY: No dysuria, frequency, or change in urination.


MUSCULOSKELETAL: No joint swelling or pain. No neck or back pain.


SKIN: No rash


NEUROLOGIC: No headache, vertigo, loss of consciousness, or change in strength/

sensation.


ENDOCRINE: No increased thirst. No abnormal weight change.


HEMATOLOGIC/LYMPHATIC: No anemia, easy bleeding, or history of blood clots.


ALLERGIC/IMMUNOLOGIC: No hives or skin allergy.








*Physical Exam





- Vital Signs


 Last Vital Signs











Temp Pulse Resp BP Pulse Ox


 


 98 F   83   16   142/89   100 


 


 19 21:23  19 21:23  19 21:23  19 21:23  19 21:23














- Physical Exam


Comments: 





19 22:06


GENERAL: Awake, alert, and fully oriented, in no acute distress


ENT: Auricles normal inspection, hearing grossly normal, nares patent, 

oropharynx clear without exudates. Moist mucosa


MOUTH: (+)Moderate tenderness at the base of the mandibular lateral incisor. (+)

Minimal edema. No tooth fracture or defect seen in the enamel of the tooth. No 

erythema or fluctuance palpated. No other acute injury or abscess observed in 

the remainder of her exam.


NECK:No masses or tenderness. No evidence of cervical lymphadenopathy. Normal 

ROM, supple, no JVD


NEUROLOGICAL: Cranial nerves II through XII grossly intact.  Normal speech.








Medical Decision Making





- Medical Decision Making





As noted above, this 34-year-old woman with a history of apparent left wisdom 

tooth (mandibular) issues for which her dentist recommended extraction several 

months ago but no other known dental issues, presents with sudden onset of pain 

in the left lower lateral incisor tooth about 6 hours prior to presentation.  

The patient was eating pretzels at the time of onset but did not note any 

fracture of the tooth.  Exam is noted: No evidence of fracture or defect in the 

enamel of the left lower lateral incisor tooth.  She has mild to moderate 

tenderness at the base of the tooth without edema or erythema.  No other acute 

dental abnormalities evident on exam.





Because of the tenderness at the base of the tooth, early odontogenic infection 

may be present.  Patient given first dose of clindamycin 300 mg now with 

prescription for 300 mg 3 times a day for 4 days sent to her pharmacy.  She was 

also given Toradol 60 mg IM for anti-inflammatory and analgesic effects.





Patient states that she had called her dentist soon after the onset of her pain 

and was told to come to the dental office tomorrow.  Meanwhile, she can 

continue to alternate acetaminophen with ibuprofen or naproxen as needed for 

pain relief








*DC/Admit/Observation/Transfer


Diagnosis at time of Disposition: 


 Tooth pain








- Discharge Dispostion


Disposition: HOME


Condition at time of disposition: Stable





- Prescriptions


Prescriptions: 


Clindamycin HCl [Cleocin HCl] 300 mg PO TID #12 capsule





- Referrals





- Patient Instructions


Printed Discharge Instructions:  DI for Dental Pain


Additional Instructions: 


Soft diet 


Clindamycin 300 mg 3 times a day for the next 4 days


Follow-up with your dentist tomorrow as planned


Alternate ibuprofen/naproxen with acetaminophen until seen by your dentist





- Post Discharge Activity

## 2020-01-03 NOTE — PDOC
History of Present Illness





- General


Chief Complaint: Pain


Stated Complaint: LEFT UPPER ARM PAIN


Time Seen by Provider: 20 10:14





- History of Present Illness


Initial Comments: 





20 10:23


Chief complaint: Left shoulder pain





HPI: Left shoulder pain, primarily with extension, less so with other movements

, for about 1 week.  Onset immediately after receiving her flu shot.  Using ice

, heat, and ibuprofen without significant relief.





Review of systems: Denies radiation of the pain to the forearm wrist or hand.  

Denies distal numbness tingling or weakness.  Denies any other joint or muscle 

aches.  Denies fever/chills, headache, URI symptoms, sore throat, cough, chest 

pain, shortness of breath, abdominal pain, nausea, vomiting, diarrhea, visual 

or focal neurologic symptoms, unsteadiness of gait.





Past medical history: Childbirth, most recently 2019, possible mild 

preeclampsia  but without significant complication.  Otherwise negative





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 

arm, but she does lift and hold her children frequently, though recalls no 

injury.





Family history: Reviewed and noncontributory





Physical exam: Alert and oriented well-developed well-nourished no acute 

distress cheerful and cooperative


Afebrile, vital signs normal


PERRLA, ENT clear


Neck supple without bruit mass or nodes


Lungs clear


CV regular without murmur rub or gallop


Abdomen benign


Neurological C2 to 12 intact.  Strength full and symmetric.  No focal sensory 

or motor deficits.  Gait stable and unimpaired.  Cerebellum intact.


Extremities: There is no deformity, swelling, palpable effusion, erythema, or 

warmth present in the shoulder or upper arm.  There is full range of motion, 

without significant pain except when extended fully, or reaching to the rear.  

There is no point tenderness.  There is no crepitus with movement.  Pulses are 

full.  No distal sensory or motor deficits.  No skin lesions or sign of prior 

injury/injection.





Impression: It is unlikely that this is related to the flu shot.  There are no 

signs of inflammation, and initial symptoms should have resolved by now.  

Suspect con commitment shoulder sprain or subacute tendinitis of the rotator 

cuff.





Plan: X-ray, symptomatic treatment, and orthopedic referral.  Recommend 

physical therapy if no improvement after conservative treatment.





Past History





- Past Medical History


Allergies/Adverse Reactions: 


 Allergies











Allergy/AdvReac Type Severity Reaction Status Date / Time


 


Penicillins Allergy   Verified 20 09:42











Home Medications: 


Ambulatory Orders





Ibuprofen 600 mg PO TID #20 tablet 20 








Asthma: No


Cancer: No


Cardiac Disorders: No


COPD: No


Diabetes: No


HTN: No


Seizures: No


Thyroid Disease: No


Other medical history: denies





- Reproductive History


 (#): 2


Para: 2





- Psycho Social/Smoking Cessation Hx


Smoking Status: Yes


Smoking History: Current every day smoker


Have you smoked in the past 12 months: Yes


Number of Cigarettes Smoked Daily: 12


If you are a former smoker, when did you quit?: 


Information on smoking cessation initiated: Yes


'Breaking Loose' booklet given: 19


Hx Alcohol Use:  (occasional)


Drug/Substance Use Hx: No


Substance Use Type: None


Hx Substance Use Treatment: No





*Physical Exam





- Vital Signs


 Last Vital Signs











Temp Pulse Resp BP Pulse Ox


 


 97.5 F L  95 H  18   108/72   100 


 


 20 09:40  20 09:40  20 09:40  20 09:40  20 09:40














Medical Decision Making





- Medical Decision Making





20 13:31


X-ray: Negative





Symptomatic treatment including anti-inflammatories, ice, gentle range of 

motion exercises, and avoiding lifting or carrying heavy objects.  Orthopedic 

follow-up in 1 week if symptoms persist, for examination, further evaluation, 

and possibly physical therapy.  Fully ambulatory in no significant distress at 

discharge to follow-up as directed





Discharge





- Discharge Information


Problems reviewed: Yes


Clinical Impression/Diagnosis: 


Shoulder tendinitis


Qualifiers:


 Laterality: left Qualified Code(s): M75.82 - Other shoulder lesions, left 

shoulder





Condition: Stable


Disposition: HOME





- Admission


No





- Additional Discharge Information


Prescriptions: 


Ibuprofen 600 mg PO TID #20 tablet





- Follow up/Referral


Referrals: 


Rich Louis MD [Staff Physician] - 1 week





- Patient Discharge Instructions


Patient Printed Discharge Instructions:  DI for Shoulder Tendinopathy


Additional Instructions: 


Rest.  Avoid exertion of the upper extremities and shoulder, and avoid lifting 

or carrying heavy objects.





Apply ice 3-4 times daily for 20 to 30 minutes, with gentle massage





Gentle stretching exercises as described, using the pendulum and wall walking 

techniques





Anti-inflammatory medication 3 times daily for 4 to 5 days to decrease 

inflammation





See orthopedist for follow-up if no improvement.  Formal physical therapy may 

be necessary.





- Post Discharge Activity

## 2020-07-01 NOTE — PDOC
History of Present Illness





- General


Chief Complaint: Laceration


Stated Complaint: LACERATION TO RIGHT CALF


Time Seen by Provider: 20 13:09





- History of Present Illness


Initial Comments: 





20 14:04


Chief complaint: Laceration





HPI: Patient sustained a laceration, right posterior mid calf, on the lid of a 

can.  Bleeding has been controlled.  There is no pain.  There is no distal 

numbness tingling pain or weakness in the ankle or foot.  Ambulating normally





Review of systems: As noted above.  No other injuries, and otherwise reviewed 

and negative





Past medical history: Denies serious medical or surgical problems past or 

present, or home medication.





Social/family history reviewed and noncontributory





Physical exam: Alert and oriented well-developed well-nourished no acute 

distress cooperative


Afebrile, vital signs normal


Posterior, mid right calf: 3 cm oblique laceration with sharp borders.  

Involving skin and superficial subcutaneous tissue.  No deep punctures.  No deep

structures exposed.  No bleeding.  Pulses to the distal extremity intact.  No 

distal sensory or motor deficits.





Impression: Superficial laceration calf





Plan: Repair, wound care instructions, follow-up for suture removal.  Recheck 

immediately if sign of infection.











Procedure note: Repair of laceration


Local anesthesia 1% lidocaine plain


Betadine prep of surrounding skin.  Wound scrubbed and irrigated copiously with 

normal saline and explored.


Superficial.  Skin and superficial subcutaneous subcutaneous tissue involved.  

No deep punctures.  No exposure of muscle or other deep structures.


Wound edges approximated with 4-0 nylon sutures x5.  Bacitracin.  Band-Aid.  

Wound care instructions and follow-up for wound check of sign of infection, 

otherwise suture removal in 10 days.  Fully ambulatory and in no pain or other 

distress, no gait disturbance, at discharge





Past History





- Medical History


Allergies/Adverse Reactions: 


                                    Allergies











Allergy/AdvReac Type Severity Reaction Status Date / Time


 


Penicillins Allergy   Verified 20 13:04











Home Medications: 


Ambulatory Orders





NK [No Known Home Medication]  20 








Asthma: No


Cancer: No


Cardiac Disorders: No


COPD: No


Diabetes: No


HTN: No


Seizures: No


Thyroid Disease: No





- Reproductive History


 (#): 2


Para: 2





- Psycho-Social/Smoking History


Smoking Status: Yes


Smoking History: Current every day smoker


Have you smoked in the past 12 months: Yes


Number of Cigarettes Smoked Daily: 10


If you are a former smoker, when did you quit?: 


Information on smoking cessation initiated: Yes


'Breaking Loose' booklet given: 19





- Substance Abuse Hx (Audit-C & DAST Scrn)


How often the patient has a drink containing alcohol: 2-4 times / month


Number of drinks the patient has on a typical day: 3 or 4


How often the patient has six or more drinks on one occasion: Never


Score: In Men: 4 or > Positive; In Women: 3 or > Positive: 3


Screen Result (Pos requires Nsg. Audit-10AR): Positive


In the last yr the pt used illegal drug/Rx for NonMed reason: No


Score:  Yes response is considered Positive: 0


Screen Result (Positive result requires Nsg. DAST-10): Negative





*Physical Exam





- Vital Signs


                                Last Vital Signs











Temp Pulse Resp BP Pulse Ox


 


 98.3 F   65   16   108/73   100 


 


 20 13:02  20 13:02  20 13:02  20 13:02  20 13:02














Discharge





- Discharge Information


Problems reviewed: Yes


Clinical Impression/Diagnosis: 


Laceration of leg


Qualifiers:


 Encounter type: initial encounter Laterality: right Qualified Code(s): S81.811A

- Laceration without foreign body, right lower leg, initial encounter





Condition: Improved


Disposition: HOME





- Admission


No





- Follow up/Referral





- Patient Discharge Instructions


Patient Printed Discharge Instructions:  DI for Laceration Repair


Additional Instructions: 


Keep clean and dry.  Change the bandage after 2 days, then daily thereafter, 

dressing with antibiotic ointment and keeping it covered until suture removal





Sutures removed in 10 days.  If there is sign of infection before that, such as 

redness, swelling, increased pain, or off colored drainage, return to the ER 

immediately for wound check.





- Post Discharge Activity

## 2022-07-18 ENCOUNTER — HOSPITAL ENCOUNTER (EMERGENCY)
Dept: HOSPITAL 74 - FER | Age: 37
Discharge: HOME | End: 2022-07-18
Payer: COMMERCIAL

## 2022-07-18 VITALS — HEART RATE: 76 BPM | SYSTOLIC BLOOD PRESSURE: 118 MMHG | DIASTOLIC BLOOD PRESSURE: 76 MMHG | TEMPERATURE: 98 F

## 2022-07-18 VITALS — BODY MASS INDEX: 32.9 KG/M2

## 2022-07-18 DIAGNOSIS — S46.911A: Primary | ICD-10-CM
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
## 2022-07-18 PROCEDURE — 3E0233Z INTRODUCTION OF ANTI-INFLAMMATORY INTO MUSCLE, PERCUTANEOUS APPROACH: ICD-10-PCS | Performed by: EMERGENCY MEDICINE

## 2023-11-04 NOTE — PDOC
Suture Removal/Wound Check HPI





- History of Present Illness


Chief Complaint: Suture/Staple Removal(Here)


Stated Complaint: SUTURE REMOVAL


Time Seen by Provider: 07/10/20 18:48


History Source: Yes: Patient


Exam Limitations: Yes: No Limitations





- Onset of Previous Treatment


Date of Occurence: 20


Comment:: 





34 yo F presents for suture removal. Sutures placed to R lower leg on  after 

she cut herself with a can. Denies any drainage from the site. No fever, chills,

redness. 








Past History





- Medical History


Allergies/Adverse Reactions: 


                                    Allergies











Allergy/AdvReac Type Severity Reaction Status Date / Time


 


Penicillins Allergy   Verified 07/10/20 18:41











Home Medications: 


Ambulatory Orders





NK [No Known Home Medication]  20 








Asthma: No


Cancer: No


Cardiac Disorders: No


COPD: No


Diabetes: No


HTN: No


Seizures: No


Thyroid Disease: No





- Reproductive History


 (#): 2


Para: 2





- Immunization History


Immunization Up to Date: Yes





- Psycho-Social/Smoking History


Smoking Status: Yes


Smoking History: Current every day smoker


Have you smoked in the past 12 months: Yes


Number of Cigarettes Smoked Daily: 10


If you are a former smoker, when did you quit?: 


Information on smoking cessation initiated: Yes


'Breaking Loose' booklet given: 19





- Substance Abuse Hx (Audit-C & DAST Scrn)


How often the patient has a drink containing alcohol: Never


Score: In Men: 4 or > Positive; In Women: 3 or > Positive: 0


Screen Result (Pos requires Nsg. Audit-10AR): Negative


In the last yr the pt used illegal drug/Rx for NonMed reason: No


Score:  Yes response is considered Positive: 0


Screen Result (Positive result requires Nsg. DAST-10): Negative





*Review of Systems





- Review of Systems


Able to Perform ROS?: Yes





GENERAL/CONSTITUTIONAL: No fever or chills. No weakness.


MUSCULOSKELETAL: No joint or muscle swelling or pain. No neck or back pain.


SKIN: No rash.











*Physical Exam





- Vital Signs


                                Last Vital Signs











Temp Pulse Resp BP Pulse Ox


 


 98.2 F   85   20   122/75   99 


 


 07/10/20 18:40  07/10/20 18:40  07/10/20 18:40  07/10/20 18:40  07/10/20 18:40














- Physical Exam





GENERAL: Awake, alert, and fully oriented, in no acute distress


EXTREMITIES: Normal range of motion, no edema.  No clubbing or cyanosis. No 

cords, erythema, or tenderness


NEUROLOGICAL: Cranial nerves II through XII grossly intact.  Normal speech, 

normal gait. Motor and sensation intact


SKIN: Warm, dry, normal turgor, no rashes. +Sutures in place to R lower leg. 











Medical Decision Making





- Medical Decision Making





Sutures removed with forceps and #11 blade. Steristrips placed. Patient 

tolerated well. Stable for DC home. 








Discharge





- Discharge Information


Problems reviewed: Yes


Clinical Impression/Diagnosis: 


 Visit for suture removal





Condition: Stable


Disposition: HOME





- Admission


No





- Follow up/Referral





- Patient Discharge Instructions


Patient Printed Discharge Instructions:  DI for Suture Removal





- Post Discharge Activity Coping Well